# Patient Record
Sex: MALE | Race: WHITE
[De-identification: names, ages, dates, MRNs, and addresses within clinical notes are randomized per-mention and may not be internally consistent; named-entity substitution may affect disease eponyms.]

---

## 2020-02-25 ENCOUNTER — HOSPITAL ENCOUNTER (EMERGENCY)
Dept: HOSPITAL 74 - JER | Age: 30
Discharge: HOME | End: 2020-02-25
Payer: SELF-PAY

## 2020-02-25 VITALS — TEMPERATURE: 98 F

## 2020-02-25 VITALS — DIASTOLIC BLOOD PRESSURE: 81 MMHG | SYSTOLIC BLOOD PRESSURE: 123 MMHG | HEART RATE: 86 BPM

## 2020-02-25 VITALS — BODY MASS INDEX: 25.8 KG/M2

## 2020-02-25 DIAGNOSIS — F10.120: Primary | ICD-10-CM

## 2020-02-25 LAB
ALBUMIN SERPL-MCNC: 4.1 G/DL (ref 3.4–5)
ALP SERPL-CCNC: 95 U/L (ref 45–117)
ALT SERPL-CCNC: 167 U/L (ref 13–61)
AMPHET UR-MCNC: NEGATIVE NG/ML
ANION GAP SERPL CALC-SCNC: 21 MMOL/L (ref 8–16)
APPEARANCE UR: CLEAR
AST SERPL-CCNC: 225 U/L (ref 15–37)
BACTERIA # UR AUTO: 15 /HPF
BARBITURATES UR-MCNC: NEGATIVE NG/ML
BASOPHILS # BLD: 0.3 % (ref 0–2)
BENZODIAZ UR SCN-MCNC: NEGATIVE NG/ML
BILIRUB SERPL-MCNC: 0.9 MG/DL (ref 0.2–1)
BILIRUB UR STRIP.AUTO-MCNC: NEGATIVE MG/DL
BUN SERPL-MCNC: 17.6 MG/DL (ref 7–18)
CALCIUM SERPL-MCNC: 8.6 MG/DL (ref 8.5–10.1)
CASTS URNS QL MICRO: 1 /LPF (ref 0–8)
CHLORIDE SERPL-SCNC: 97 MMOL/L (ref 98–107)
CO2 SERPL-SCNC: 19 MMOL/L (ref 21–32)
COCAINE UR-MCNC: NEGATIVE NG/ML
COLOR UR: YELLOW
CREAT SERPL-MCNC: 0.8 MG/DL (ref 0.55–1.3)
DEPRECATED RDW RBC AUTO: 14.6 % (ref 11.9–15.9)
EOSINOPHIL # BLD: 0 % (ref 0–4.5)
EPITH CASTS URNS QL MICRO: 1.8 /HPF
GLUCOSE SERPL-MCNC: 83 MG/DL (ref 74–106)
HCT VFR BLD CALC: 50.4 % (ref 35.4–49)
HGB BLD-MCNC: 17.2 GM/DL (ref 11.7–16.9)
KETONES UR QL STRIP: (no result)
LEUKOCYTE ESTERASE UR QL STRIP.AUTO: NEGATIVE
LIPASE SERPL-CCNC: 266 U/L (ref 73–393)
LYMPHOCYTES # BLD: 14 % (ref 8–40)
MAGNESIUM SERPL-MCNC: 2.7 MG/DL (ref 1.8–2.4)
MCH RBC QN AUTO: 35.3 PG (ref 25.7–33.7)
MCHC RBC AUTO-ENTMCNC: 34.2 G/DL (ref 32–35.9)
MCV RBC: 103.4 FL (ref 80–96)
METHADONE UR-MCNC: NEGATIVE NG/ML
MONOCYTES # BLD AUTO: 6.2 % (ref 3.8–10.2)
NEUTROPHILS # BLD: 79.5 % (ref 42.8–82.8)
NITRITE UR QL STRIP: NEGATIVE
OPIATES UR QL SCN: NEGATIVE NG/ML
PCP UR QL SCN: NEGATIVE NG/ML
PH UR: 6.5 [PH] (ref 5–8)
PLATELET # BLD AUTO: 226 K/MM3 (ref 134–434)
PMV BLD: 7.7 FL (ref 7.5–11.1)
POTASSIUM SERPLBLD-SCNC: 3.6 MMOL/L (ref 3.5–5.1)
PROT SERPL-MCNC: 7.8 G/DL (ref 6.4–8.2)
PROT UR QL STRIP: (no result)
PROT UR QL STRIP: NEGATIVE
RBC # BLD AUTO: 1 /HPF (ref 0–4)
RBC # BLD AUTO: 4.88 M/MM3 (ref 4–5.6)
SODIUM SERPL-SCNC: 138 MMOL/L (ref 136–145)
SP GR UR: 1.02 (ref 1.01–1.03)
UROBILINOGEN UR STRIP-MCNC: 1 MG/DL (ref 0.2–1)
WBC # BLD AUTO: 9 K/MM3 (ref 4–10)
WBC # UR AUTO: 1 /HPF (ref 0–5)

## 2020-02-25 PROCEDURE — 3E033GC INTRODUCTION OF OTHER THERAPEUTIC SUBSTANCE INTO PERIPHERAL VEIN, PERCUTANEOUS APPROACH: ICD-10-PCS

## 2020-02-25 NOTE — PDOC
History of Present Illness





- General


Chief Complaint: Alcohol intoxication


Stated Complaint: INTOXICATED


Time Seen by Provider: 02/25/20 17:11





- History of Present Illness


Initial Comments: 





The pt is a 29M w/ a history of EtOH abuse who presents for evaluation of EtOH 

intoxication. He is accompanied by his fiance. Per his fiance he has been 

drinking heavily for the last three days. Last drink 2 hours ago. Reports 

multiple falls over the last several days.


Endorses epigastric abdominal pain that is achy/cramping, intermittent, non-

radiating, and not exacerbated or alleviated by anything he can identify.


Endorses vomiting x1 today.


Denies HA, vision changes, SOB, chest pain, diarrhea, dysuria, hematuria, wound/

rash/lesions


02/25/20 18:10











Past History





- Past Medical History


Allergies/Adverse Reactions: 


 Allergies











Allergy/AdvReac Type Severity Reaction Status Date / Time


 


acetaminophen [From Tylenol] Allergy   Verified 02/25/20 17:16


 


divalproex sodium Allergy   Verified 02/25/20 17:15





[From Depakote]     


 


prednisone Allergy   Verified 02/25/20 17:15











COPD: No


Other medical history: alcohol abuse





- Psycho Social/Smoking Cessation Hx


Smoking History: Never smoked


Information on smoking cessation initiated: No


Hx Alcohol Use: Yes


Drug/Substance Use Hx: No





**Review of Systems





- Review of Systems


Able to Perform ROS?: Yes


Comments:: 





GENERAL/CONSTITUTIONAL: No fever or chills. No weakness


HEAD, EYES, EARS, NOSE AND THROAT: No change in vision. No change in hearing. 

No sore throat


CARDIOVASCULAR: No chest pain or shortness of breath


RESPIRATORY: Denies cough, hemoptysis


GASTROINTESTINAL: No diarrhea or constipation


GENITOURINARY: No dysuria, frequency, or change in urination


MUSCULOSKELETAL: No joint or muscle swelling or pain. No neck or back pain


SKIN: No rash


NEUROLOGIC: No headache, vertigo, loss of consciousness, or change in strength/

sensation


ENDOCRINE: No increased thirst. No abnormal weight change


HEMATOLOGIC/LYMPHATIC: No anemia, easy bleeding, or history of blood clots


ALLERGIC/IMMUNOLOGIC: No hives or skin allergy





02/25/20 18:15





Is the patient limited English proficient: No





*Physical Exam





- Vital Signs


 Last Vital Signs











Temp Pulse Resp BP Pulse Ox


 


 98 F   110 H  19   129/87   99 


 


 02/25/20 17:13  02/25/20 17:13  02/25/20 17:13  02/25/20 17:13  02/25/20 17:13














- Physical Exam





GENERAL: Awake, alert, and oriented to person/place, appears intoxicated


HEAD: No signs of trauma, normocephalic, atraumatic


EYES: PERRLA, EOMI, sclera anicteric, conjunctiva clear


ENT: Hearing grossly normal, nares patent, oropharynx clear without exudates. 

Moist mucosa


LUNGS: No distress, speaks in full sentences, clear to auscultation bilaterally


HEART: Tachycardic rate and regular rhythm, normal S1 and S2, no murmurs 

appreciated, peripheral pulses normal and equal bilaterally


ABDOMEN: Soft, nontender, normoactive bowel sounds. No guarding, no rebound


EXTREMITIES: Normal inspection, Normal range of motion, no edema. No clubbing 

or cyanosis


NEUROLOGICAL: Cranial nerves II through XII grossly intact. Normal speech, 

normal gait, no focal sensorimotor deficits


SKIN: Warm, Dry





02/25/20 18:15








ED Treatment Course





- LABORATORY


CBC & Chemistry Diagram: 


 02/25/20 17:45





 02/25/20 17:45





- RADIOLOGY


Radiology Studies Ordered: 














 Category Date Time Status


 


 CERVICAL SPINE CT W/O CONTR [CT] Stat CT Scan  02/25/20 18:09 Ordered


 


 HEAD CT WITHOUT CONTRAST [CT] Stat CT Scan  02/25/20 18:09 Ordered











Radiograph Interpretation: 





CT/CERVICAL SPINE CT W/O CONTR


No fracture or posttraumatic malalignment is seen.


The disc spaces appear preserved. No facet arthropathy is seen.


There is no canal stenosis. No gross disc herniation is identified within the 

limitations of noncontrast CT.


The perivertebral soft tissues demonstrate no obvious pathology.


Impression:


No fracture is identified.





CT/HEAD CT WITHOUT CONTRAST


No CT evidence of intracranial injury or calvarial fracture.


There is no extra-axial fluid collection.


No obvious mass lesion is seen on noncontrast imaging.


There is no definite abnormal intracranial attenuation.


The ventricles and cisterns appear unremarkable.


The partially imaged paranasal sinuses demonstrate no opacification.


Impression:


No CT evidence of acute intracranial pathology.


02/25/20 19:49





Medical Decision Making





- Medical Decision Making





The pt is a 29M w/ a history of EtOH abuse who presents for evaluation of EtOH 

intoxication.





ED Course


Labs sent and zofran ordered from [facility]


CT head and c-spine given multiple falls w/ intox


IVF


02/25/20 18:16





CT head and c-spine w/o acute pathology


02/25/20 19:50





Labs unremarkable


Pt feels at his baseline at this time


Pt oriented x3, ambulating with stable gait


Pt appears clinically sober at this time, pt will go home with fiance


Plan for D/C w/ PCP f/u


Discharge instructions and return precautions given


Patient in agreement and verbalized understanding


Dispo: Home


02/25/20 20:37





Discharge





- Discharge Information


Problems reviewed: Yes


Clinical Impression/Diagnosis: 


 Alcohol abuse





Vomiting


Qualifiers:


 Vomiting type: unspecified Vomiting Intractability: unspecified Nausea presence

: unspecified Qualified Code(s): R11.10 - Vomiting, unspecified





Condition: Improved


Disposition: HOME





- Admission


No





- Follow up/Referral


Referrals: 


Benny Burch MD [Staff Physician] - 


[facility] [Provider Group]





- Patient Discharge Instructions


Patient Printed Discharge Instructions:  DI for Alcohol Abuse


Additional Instructions: 


You were seen in the Emergency Department for evaluation of alcohol 

intoxication. Your labs and imaging were unremarkable. Review the handout 

provided at discharge. Follow up with your primary care provider or referral 

given within a week. 


Return to the Emergency Department if you develop fevers, chest pain, trouble 

breathing, worsening symptoms, or any new/concerning symptoms.





- Post Discharge Activity


Work/Back to School Note:  Back to Work

## 2020-02-25 NOTE — PDOC
Documentation entered by Barbie Beckford SCRIBE, acting as scribe for Jeanne Amaro MD.








Jeanne Amaro MD:  This documentation has been prepared by the joshuaibe, 

Barbie Beckford SCRIBE, under my direction and personally reviewed by me in its 

entirety.  I confirm that the documentation accurately reflects all work, 

treatment, procedures, and medical decision making performed by me.  





Attending Attestation





- Resident


Resident Name: Bret Valladares





- ED Attending Attestation


I have performed the following: I have examined & evaluated the patient, The 

case was reviewed & discussed with the resident, I agree w/resident's findings 

& plan, Exceptions are as noted





- HPI


HPI: 


02/25/20 18:12


Slender 29-year-old male presents intoxicated complaining of abdominal 

discomfort and headache


He has been drinking for 3 days


He states he has had multiple falls in the last 72 hours





02/25/20 18:20


The patient is a 29-year-old male with no reported past medical history who 

presents to the emergency department intoxicated. The patient reports hes been 

drinking alcohol for the past three days and hasnt been eating anything. The 

patient states his last drink was 2 hours ago. The patient is complaining of 

abdominal pain and a headache and reports he has had multiple falls. 





- Physicial Exam


PE: 


02/25/20 18:20


GENERAL: slender 30 yo male presents intoxicated and has c/o HA, epigastric 

pain. in no acute distress. 


HEENT: +linear 8cm scar to the commissure of the right lip to the ear. No scalp 

laceration. 


Normocephalic, atraumatic. PERRL, EOM intact.


Neck: supple. 


CARDIOVASCULAR: 


Regular rhythm and tachycardiac. 


PULMONARY: 


Clear to auscultation bilaterally.


ABDOMEN: 


Soft, non-distended, non-tender. 


EXTREMITIES: 


Normal ROM in all four extremities. No gross deformities.


SKIN: 


Warm, dry.  No rash


NEUROLOGICAL: AOB,intoxicated but can follow simple directions,moving all 

extremities





- Medical Decision Making





02/25/20 19:00


Patient will receive IV fluids and we will look at his labs or any electrolyte 

abnormalities impression alcohol intoxication DC after fluids and reassessment


02/25/20 19:21


CAT scan of the head does not show any acute intracranial pathology, there is 

no skull fracture


02/25/20 20:45


Patient is feeling better and his fiance is here to take him home


Discharge EtOH intoxication

## 2020-02-25 NOTE — PDOC
Rapid Medical Evaluation


Time Seen by Provider: 02/25/20 17:11


Medical Evaluation: 





02/25/20 17:13


Pt c/o: intoxicated , drinking x 3 days, now dry heaving and weak


Pt on brief exam: anxious, intoxicated, tachy at 110, 


pt ordered for: labs, urine, ivf


pt to proceed to the ED


02/25/20 17:14








**Discharge Disposition





- Diagnosis


 Alcohol abuse, Vomiting








- Referrals





- Patient Instructions





- Post Discharge Activity